# Patient Record
Sex: MALE | Race: BLACK OR AFRICAN AMERICAN | NOT HISPANIC OR LATINO | Employment: UNEMPLOYED | URBAN - METROPOLITAN AREA
[De-identification: names, ages, dates, MRNs, and addresses within clinical notes are randomized per-mention and may not be internally consistent; named-entity substitution may affect disease eponyms.]

---

## 2017-03-20 ENCOUNTER — HOSPITAL ENCOUNTER (EMERGENCY)
Facility: HOSPITAL | Age: 57
Discharge: HOME | End: 2017-03-20
Attending: FAMILY MEDICINE
Payer: MEDICAID

## 2017-03-20 VITALS
TEMPERATURE: 99 F | SYSTOLIC BLOOD PRESSURE: 135 MMHG | DIASTOLIC BLOOD PRESSURE: 76 MMHG | RESPIRATION RATE: 18 BRPM | BODY MASS INDEX: 41.03 KG/M2 | HEART RATE: 73 BPM | OXYGEN SATURATION: 97 % | HEIGHT: 69 IN | WEIGHT: 277 LBS

## 2017-03-20 DIAGNOSIS — M25.562 ACUTE PAIN OF LEFT KNEE: Primary | ICD-10-CM

## 2017-03-20 PROCEDURE — 99283 EMERGENCY DEPT VISIT LOW MDM: CPT

## 2017-03-20 PROCEDURE — 99283 EMERGENCY DEPT VISIT LOW MDM: CPT | Mod: ,,, | Performed by: PHYSICIAN ASSISTANT

## 2017-03-20 RX ORDER — NAPROXEN 500 MG/1
500 TABLET ORAL 2 TIMES DAILY WITH MEALS
Qty: 14 TABLET | Refills: 0 | Status: SHIPPED | OUTPATIENT
Start: 2017-03-20

## 2017-03-20 NOTE — ED PROVIDER NOTES
Encounter Date: 3/20/2017    SCRIBE #1 NOTE: I, Debbieantonio Collado, am scribing for, and in the presence of, Chantel Villegas PA-C.       History     Chief Complaint   Patient presents with    Knee Pain     started hurting on friday, can't recall injury     Review of patient's allergies indicates:  No Known Allergies  HPI Comments: Time seen by provider: 2:51 PM    This is a 56 y.o. male with no PMHx who presents with complaint of left knee pain that acutely onset after bending over to  an item off of the floor 3 days ago. Patient explains pain did not become significant until last night. He reports he can barely bear weight to leg, and pain is exacerbated by movement. He states he took 2 Ibuprofen with some improvement in pain. He denies paresthesias to the left leg, fever, and chills.       The history is provided by the patient.     History reviewed. No pertinent past medical history.  History reviewed. No pertinent surgical history.  History reviewed. No pertinent family history.  Social History   Substance Use Topics    Smoking status: Current Every Day Smoker     Packs/day: 1.00     Types: Cigarettes    Smokeless tobacco: None    Alcohol use No     Review of Systems   Constitutional: Negative for chills and fever.   Musculoskeletal:        Positive for left knee pain   Neurological:        Negative for paresthesias        Physical Exam   Initial Vitals   BP Pulse Resp Temp SpO2   03/20/17 1222 03/20/17 1222 03/20/17 1222 03/20/17 1222 03/20/17 1222   135/76 73 18 98.6 °F (37 °C) 97 %     Physical Exam    Nursing note and vitals reviewed.  Constitutional:   Non-toxic appearing. Patient obese   Cardiovascular: Normal rate, regular rhythm and intact distal pulses.   Pulmonary/Chest: No tachypnea.   Normal effort   Musculoskeletal:   Tenderness to the anterior interior aspect of the left knee. No significant swelling. No gross deformity. No erythema or warmth. Full range of motion of the knee with pain. With  flexion past 90 degrees. No laxity with varus or valgus stress.    Neurological: He is alert and oriented to person, place, and time.         ED Course   Procedures  Labs Reviewed - No data to display          Medical Decision Making:   History:   Old Medical Records: I decided to obtain old medical records.       APC / Resident Notes:   MDM:    My initial differential diagnosis includes sprain, strain, arthritis, gout, fracture, and septic joint.     56 year old male presents for evaluation of left knee pain after an awkward movement leaning over. Pain is improved with Ibuprofen. He is afebrile. Vitals are within normal limits. Tenderness to the anterior interior aspect of the left knee. No significant swelling. No gross deformity. No erythema or warmth. Full range of motion of the knee with pain. Flexion past 90 degrees. No laxity with varus or valgus stress. He is neurovascularly intact. Patient seen ambulated in the ED.     I do not feel that further workup or imaging is indicated. Patient's symptoms are likely secondary to a sprain versus arthritis. I doubt gout or septic joint. I will discharge with NSAID's and instructions for RICE therapy. PCP follow up in 3-4 days for reevaluation. ED warnings and return instructions given. I have reviewed this patient's medical chart and discussed with my supervising physician.       Scribe Attestation:   Scribe #1: I performed the above scribed service and the documentation accurately describes the services I performed. I attest to the accuracy of the note.    Attending Attestation:           Physician Attestation for Scribe:  Physician Attestation Statement for Scribe #1: I, Chantel Villegas PA-C, reviewed documentation, as scribed by Debbie Collado in my presence, and it is both accurate and complete.                 ED Course     Clinical Impression:   The encounter diagnosis was Acute pain of left knee.    Disposition:   Disposition: Discharged  Condition: Stable        Chantel Villegas PA-C  03/20/17 3569

## 2017-03-20 NOTE — DISCHARGE INSTRUCTIONS
Knee Pain  Knee pain is very common. Its especially common in active people who put a lot of pressure on their knees, like runners. It affects women more often than men.  Your kneecap (patella) is a thick, round bone. It covers and protects the front portion of your knee joint. It moves along a groove in your thighbone (femur) as part of the patellofemoral joint. A layer of cartilage surrounds the underside of your kneecap. This layer protects it from grinding against your femur.  When this cartilage softens and breaks down, it can cause knee pain. This is partly because of repetitive stress. The stress irritates the lining of the joint. This causes pain in the underlying bone.  What causes knee pain?  Many things can cause knee pain. You may have more than one cause. Some of these include:  · Overuse of the knee joint  · The kneecap doesnt line up with the tissue around it  · Damage to small nerves in the area  · Damage to the ligament-like structure that holds the kneecap in place (retinaculum)  · Breakdown of the bone under the cartilage  · Swelling in the soft tissues around the kneecap  · Injury  You might be more likely to have knee pain if you:  · Exercise a lot  · Recently increased the intensity of your workouts  · Have a body mass index (BMI) greater than 25  · Have poor alignment of your kneecap  · Walk with your feet turned overly outward or inward  · Have weakness in surrounding muscle groups (inner quad or hip adductor muscles)  · Have too much tightness in surrounding muscle groups (hamstrings or iliotibial band)  · Have a recent history of injury to the area  · Are female  Symptoms of knee pain  This type of knee pain is a dull, aching pain in the front of the knee in the area under and around the kneecap. This pain may start quickly or slowly. Your pain might be worse when you squat, run, or sit for a long time. You might also sometimes feel like your knee is giving out. You may have symptoms in  one or both of your knees.  Diagnosing knee pain  Your health care provider will ask about your medical history and your symptoms. Be sure to describe any activities that make your knee pain worse. He or she will look at your knee. This will include tests of your range of motion, strength, and areas of pain of your knee. Your knee alignment will be checked.  Your health care provider will need to rule out other causes of your knee pain, such as arthritis. You may need an imaging test, such as an X-ray or MRI.  Treatment for knee pain  Treatments that can help ease your symptoms may include:  · Avoiding activities for a while that make your pain worse, returning to activity over time  · Icing the outside of your knee when it causes you pain  · Taking over-the-counter pain medicine  · Wearing a knee brace or taping your knee to support it  · Wearing special shoe inserts to help keep your feet in the proper alignment  · Doing special exercises to stretch and strengthen the muscles around your hip and your knee  These steps help most people manage knee pain. But some cases of knee pain need to be treated with surgery. You may need surgery right away. Or you may need it later if other treatments dont work. Your health care provider may refer you to an orthopedic surgeon. He or she will talk with you about your choices.  Preventing knee pain  Losing weight and correcting excess muscle tightness or muscle weakness may help lower your risk.  In some cases, you can prevent knee pain. To help prevent a flare-up of knee pain, you do these things:  · Regularly do all the exercises your doctor or physical therapist advises  · Support your knee as advised by your doctor or physical therapist  · Increase training gradually, and ease up on training when needed  · Have an expert check your gait for running or other sporting activities  · Stretch properly before and after exercise  · Replace your running shoes regularly  · Lose  excess weight     When to call your health care provider  Call your health care provider right away if:  · Your symptoms dont get better after a few weeks of treatment  · You have any new symptoms   Date Last Reviewed: 3/19/2015  © 5047-3571 [employer]. [address] [place] [address], [place] [address]. All rights reserved. This information is not intended as a substitute for professional medical care. Always follow your healthcare professional's instructions.

## 2017-03-20 NOTE — ED AVS SNAPSHOT
[facility]  [address]  [facility] [address]               Stevan Camargo   3/20/2017  2:06 PM   ED    Description:  Male : 1960   Department:  [facility]           Your Care was Coordinated By:     Provider Role From To    Constantino Bennett MD Attending Provider 17 [phone] --    Chantel Villegas PA-C Physician Assistant 17 [phone] --      Reason for Visit     Knee Pain           Diagnoses this Visit        Comments    Acute pain of left knee    -  Primary       ED Disposition     ED Disposition Condition Comment    Discharge             To Do List           Follow-up Information     Follow up with [facility].    Contact information:    [address] [facility] [address]  [phone]         These Medications        Disp Refills Start End    naproxen (NAPROSYN) 500 MG tablet 14 tablet 0 3/20/2017     Take 1 tablet (500 mg total) by mouth 2 (two) times daily with meals. Take with food - Oral      [facility] On Call     [facility] Care [facility] -  Assistance  Registered nurses in the [facility] provide clinical advisement, health education, appointment booking, and other advisory services.  Call for this free service at [phone].             Medications           Message regarding Medications     Verify the changes and/or additions to your medication regime listed below are the same as discussed with your clinician today.  If any of these changes or additions are incorrect, please notify your healthcare provider.        START taking these NEW medications        Refills    naproxen (NAPROSYN) 500 MG tablet 0    Sig: Take 1 tablet (500 mg total) by mouth 2 (two) times daily with meals. Take with food    Class: Print    Route: Oral           Verify that the below list of medications is an accurate representation of the medications you are currently taking.  If none reported, the list  "may be blank. If incorrect, please contact your healthcare provider. Carry this list with you in case of emergency.           Current Medications     naproxen (NAPROSYN) 500 MG tablet Take 1 tablet (500 mg total) by mouth 2 (two) times daily with meals. Take with food           Clinical Reference Information           Your Vitals Were     BP Pulse Temp Resp Height Weight    135/76 73 98.6 °F (37 °C) (Oral) 18 5' 8.5" (1.74 m) 125.6 kg (277 lb)    SpO2 BMI             97% 41.51 kg/m2         Allergies as of 3/20/2017     No Known Allergies      Immunizations Administered on Date of Encounter - 3/20/2017     None      ED Micro, Lab, POCT     None      ED Imaging Orders     None        Discharge Instructions         Knee Pain  Knee pain is very common. Its especially common in active people who put a lot of pressure on their knees, like runners. It affects women more often than men.  Your kneecap (patella) is a thick, round bone. It covers and protects the front portion of your knee joint. It moves along a groove in your thighbone (femur) as part of the patellofemoral joint. A layer of cartilage surrounds the underside of your kneecap. This layer protects it from grinding against your femur.  When this cartilage softens and breaks down, it can cause knee pain. This is partly because of repetitive stress. The stress irritates the lining of the joint. This causes pain in the underlying bone.  What causes knee pain?  Many things can cause knee pain. You may have more than one cause. Some of these include:  · Overuse of the knee joint  · The kneecap doesnt line up with the tissue around it  · Damage to small nerves in the area  · Damage to the ligament-like structure that holds the kneecap in place (retinaculum)  · Breakdown of the bone under the cartilage  · Swelling in the soft tissues around the kneecap  · Injury  You might be more likely to have knee pain if you:  · Exercise a lot  · Recently increased the intensity " of your workouts  · Have a body mass index (BMI) greater than 25  · Have poor alignment of your kneecap  · Walk with your feet turned overly outward or inward  · Have weakness in surrounding muscle groups (inner quad or hip adductor muscles)  · Have too much tightness in surrounding muscle groups (hamstrings or iliotibial band)  · Have a recent history of injury to the area  · Are female  Symptoms of knee pain  This type of knee pain is a dull, aching pain in the front of the knee in the area under and around the kneecap. This pain may start quickly or slowly. Your pain might be worse when you squat, run, or sit for a long time. You might also sometimes feel like your knee is giving out. You may have symptoms in one or both of your knees.  Diagnosing knee pain  Your health care provider will ask about your medical history and your symptoms. Be sure to describe any activities that make your knee pain worse. He or she will look at your knee. This will include tests of your range of motion, strength, and areas of pain of your knee. Your knee alignment will be checked.  Your health care provider will need to rule out other causes of your knee pain, such as arthritis. You may need an imaging test, such as an X-ray or MRI.  Treatment for knee pain  Treatments that can help ease your symptoms may include:  · Avoiding activities for a while that make your pain worse, returning to activity over time  · Icing the outside of your knee when it causes you pain  · Taking over-the-counter pain medicine  · Wearing a knee brace or taping your knee to support it  · Wearing special shoe inserts to help keep your feet in the proper alignment  · Doing special exercises to stretch and strengthen the muscles around your hip and your knee  These steps help most people manage knee pain. But some cases of knee pain need to be treated with surgery. You may need surgery right away. Or you may need it later if other treatments dont work. Your  health care provider may refer you to an orthopedic surgeon. He or she will talk with you about your choices.  Preventing knee pain  Losing weight and correcting excess muscle tightness or muscle weakness may help lower your risk.  In some cases, you can prevent knee pain. To help prevent a flare-up of knee pain, you do these things:  · Regularly do all the exercises your doctor or physical therapist advises  · Support your knee as advised by your doctor or physical therapist  · Increase training gradually, and ease up on training when needed  · Have an expert check your gait for running or other sporting activities  · Stretch properly before and after exercise  · Replace your running shoes regularly  · Lose excess weight     When to call your health care provider  Call your health care provider right away if:  · Your symptoms dont get better after a few weeks of treatment  · You have any new symptoms   Date Last Reviewed: 3/19/2015  © 5533-4151 [employer]. [address] [place] [address]. All rights reserved. This information is not intended as a substitute for professional medical care. Always follow your healthcare professional's instructions.          Discharge References/Attachments     R.I.C.E. (ENGLISH)      [facility] Sign-Up     Activating your [facility] account is as easy as 1-2-3!     1) Visit [email or web address], select Sign Up Now, enter this activation code and your date of birth, then select Next.  [email or web address]  Expires: 5/4/2017  3:10 PM      2) Create a username and password to use when you visit [facility] in the future and select a security question in case you lose your password and select Next.    3) Enter your e-mail address and click Sign Up!    Additional Information  If you have questions, please e-mail [email or web address] or call [phone] to talk to our [facility] staff. Remember, [facility] is NOT to be used for urgent needs. For medical emergencies, dial 911.          Smoking Cessation     If you would like to quit smoking:   You may be eligible for free services if you are a [place] resident and started smoking cigarettes before September 1, 1988.  Call the Smoking Cessation Trust (SCT) toll free at (661) 993-4474 or (865) 571-8798.   Call 1-800-QUIT-NOW if you do not meet the above criteria.             [facility] complies with applicable Federal civil rights laws and does not discriminate on the basis of race, color, national origin, age, disability, or sex.        Language Assistance Services     ATTENTION: Language assistance services are available, free of charge. Please call [phone].      ATENCIÓN: Si habla español, tiene a hanna disposición servicios gratuitos de asistencia lingüística. Llame al [phone].     CHÚ Ý: N?u b?n nói Ti?ng Vi?t, có các d?ch v? h? tr? ngôn ng? mi?n phí dành cho b?n. G?i s? [phone].

## 2023-09-18 DIAGNOSIS — Z76.89 ENCOUNTER TO ESTABLISH CARE: Primary | ICD-10-CM

## 2023-09-18 DIAGNOSIS — Z00.00 ROUTINE ADULT HEALTH MAINTENANCE: ICD-10-CM

## 2023-09-19 ENCOUNTER — HOSPITAL ENCOUNTER (OUTPATIENT)
Dept: CARDIOLOGY | Facility: HOSPITAL | Age: 63
Discharge: HOME | End: 2023-09-19
Attending: INTERNAL MEDICINE
Payer: MEDICARE

## 2023-09-19 ENCOUNTER — OFFICE VISIT (OUTPATIENT)
Dept: CARDIOLOGY | Facility: CLINIC | Age: 63
End: 2023-09-19
Payer: MEDICARE

## 2023-09-19 VITALS
SYSTOLIC BLOOD PRESSURE: 112 MMHG | OXYGEN SATURATION: 95 % | DIASTOLIC BLOOD PRESSURE: 68 MMHG | HEIGHT: 69 IN | BODY MASS INDEX: 41.86 KG/M2 | WEIGHT: 282.63 LBS | HEART RATE: 69 BPM

## 2023-09-19 DIAGNOSIS — I82.5Y3 CHRONIC DEEP VEIN THROMBOSIS (DVT) OF PROXIMAL VEIN OF BOTH LOWER EXTREMITIES: ICD-10-CM

## 2023-09-19 DIAGNOSIS — I25.118 CORONARY ARTERY DISEASE INVOLVING NATIVE CORONARY ARTERY OF NATIVE HEART WITH OTHER FORM OF ANGINA PECTORIS: ICD-10-CM

## 2023-09-19 DIAGNOSIS — F17.290 SMOKES CIGARS: ICD-10-CM

## 2023-09-19 DIAGNOSIS — I50.810 RIGHT VENTRICULAR FAILURE: ICD-10-CM

## 2023-09-19 DIAGNOSIS — I50.30 HEART FAILURE WITH PRESERVED EJECTION FRACTION, UNSPECIFIED HF CHRONICITY: ICD-10-CM

## 2023-09-19 DIAGNOSIS — Z00.00 ROUTINE ADULT HEALTH MAINTENANCE: ICD-10-CM

## 2023-09-19 DIAGNOSIS — E78.5 HYPERLIPIDEMIA, UNSPECIFIED HYPERLIPIDEMIA TYPE: Primary | ICD-10-CM

## 2023-09-19 DIAGNOSIS — E66.01 MORBID OBESITY: ICD-10-CM

## 2023-09-19 DIAGNOSIS — Z76.89 ENCOUNTER TO ESTABLISH CARE: ICD-10-CM

## 2023-09-19 DIAGNOSIS — J44.9 CHRONIC OBSTRUCTIVE PULMONARY DISEASE, UNSPECIFIED COPD TYPE: ICD-10-CM

## 2023-09-19 PROCEDURE — 93010 ELECTROCARDIOGRAM REPORT: CPT | Mod: ,,, | Performed by: INTERNAL MEDICINE

## 2023-09-19 PROCEDURE — 99204 PR OFFICE/OUTPT VISIT, NEW, LEVL IV, 45-59 MIN: ICD-10-PCS | Mod: S$GLB,,, | Performed by: INTERNAL MEDICINE

## 2023-09-19 PROCEDURE — 4010F PR ACE/ARB THEARPY RXD/TAKEN: ICD-10-PCS | Mod: CPTII,S$GLB,, | Performed by: INTERNAL MEDICINE

## 2023-09-19 PROCEDURE — 1159F PR MEDICATION LIST DOCUMENTED IN MEDICAL RECORD: ICD-10-PCS | Mod: CPTII,S$GLB,, | Performed by: INTERNAL MEDICINE

## 2023-09-19 PROCEDURE — 99204 OFFICE O/P NEW MOD 45 MIN: CPT | Mod: S$GLB,,, | Performed by: INTERNAL MEDICINE

## 2023-09-19 PROCEDURE — 3078F DIAST BP <80 MM HG: CPT | Mod: CPTII,S$GLB,, | Performed by: INTERNAL MEDICINE

## 2023-09-19 PROCEDURE — 3074F SYST BP LT 130 MM HG: CPT | Mod: CPTII,S$GLB,, | Performed by: INTERNAL MEDICINE

## 2023-09-19 PROCEDURE — 3008F BODY MASS INDEX DOCD: CPT | Mod: CPTII,S$GLB,, | Performed by: INTERNAL MEDICINE

## 2023-09-19 PROCEDURE — 3008F PR BODY MASS INDEX (BMI) DOCUMENTED: ICD-10-PCS | Mod: CPTII,S$GLB,, | Performed by: INTERNAL MEDICINE

## 2023-09-19 PROCEDURE — 4010F ACE/ARB THERAPY RXD/TAKEN: CPT | Mod: CPTII,S$GLB,, | Performed by: INTERNAL MEDICINE

## 2023-09-19 PROCEDURE — 99999 PR PBB SHADOW E&M-EST. PATIENT-LVL IV: CPT | Mod: PBBFAC,,, | Performed by: INTERNAL MEDICINE

## 2023-09-19 PROCEDURE — 3078F PR MOST RECENT DIASTOLIC BLOOD PRESSURE < 80 MM HG: ICD-10-PCS | Mod: CPTII,S$GLB,, | Performed by: INTERNAL MEDICINE

## 2023-09-19 PROCEDURE — 93010 EKG 12-LEAD: ICD-10-PCS | Mod: ,,, | Performed by: INTERNAL MEDICINE

## 2023-09-19 PROCEDURE — 99999 PR PBB SHADOW E&M-EST. PATIENT-LVL IV: ICD-10-PCS | Mod: PBBFAC,,, | Performed by: INTERNAL MEDICINE

## 2023-09-19 PROCEDURE — 3074F PR MOST RECENT SYSTOLIC BLOOD PRESSURE < 130 MM HG: ICD-10-PCS | Mod: CPTII,S$GLB,, | Performed by: INTERNAL MEDICINE

## 2023-09-19 PROCEDURE — 1159F MED LIST DOCD IN RCRD: CPT | Mod: CPTII,S$GLB,, | Performed by: INTERNAL MEDICINE

## 2023-09-19 PROCEDURE — 93005 ELECTROCARDIOGRAM TRACING: CPT

## 2023-09-19 RX ORDER — ACETAZOLAMIDE 125 MG/1
125 TABLET ORAL
COMMUNITY
Start: 2023-08-27

## 2023-09-19 RX ORDER — RIVAROXABAN 20 MG/1
20 TABLET, FILM COATED ORAL
COMMUNITY
Start: 2023-08-29

## 2023-09-19 RX ORDER — ISOSORBIDE DINITRATE 10 MG/1
10 TABLET ORAL
COMMUNITY
Start: 2023-07-20

## 2023-09-19 RX ORDER — UMECLIDINIUM 62.5 UG/1
1 AEROSOL, POWDER ORAL
COMMUNITY
Start: 2023-07-20

## 2023-09-19 RX ORDER — ALLOPURINOL 300 MG/1
300 TABLET ORAL
COMMUNITY
Start: 2023-07-10

## 2023-09-19 RX ORDER — GABAPENTIN 300 MG/1
300 CAPSULE ORAL
COMMUNITY
Start: 2023-09-06

## 2023-09-19 RX ORDER — FLUTICASONE PROPIONATE AND SALMETEROL XINAFOATE 45; 21 UG/1; UG/1
1 AEROSOL, METERED RESPIRATORY (INHALATION) 2 TIMES DAILY
COMMUNITY

## 2023-09-19 RX ORDER — ASPIRIN 81 MG/1
81 TABLET ORAL DAILY
COMMUNITY

## 2023-09-19 RX ORDER — ALBUTEROL SULFATE 90 UG/1
AEROSOL, METERED RESPIRATORY (INHALATION)
COMMUNITY
Start: 2023-05-23

## 2023-09-19 RX ORDER — IPRATROPIUM BROMIDE AND ALBUTEROL 20; 100 UG/1; UG/1
SPRAY, METERED RESPIRATORY (INHALATION)
COMMUNITY
Start: 2023-04-11

## 2023-09-19 RX ORDER — NITROGLYCERIN 0.4 MG/1
TABLET SUBLINGUAL
COMMUNITY
Start: 2023-05-23

## 2023-09-19 RX ORDER — OXYCODONE AND ACETAMINOPHEN 5; 325 MG/1; MG/1
1 TABLET ORAL EVERY 6 HOURS PRN
COMMUNITY
Start: 2023-07-11

## 2023-09-19 RX ORDER — METOLAZONE 2.5 MG/1
2.5 TABLET ORAL
COMMUNITY
Start: 2023-07-10

## 2023-09-19 RX ORDER — SACUBITRIL AND VALSARTAN 24; 26 MG/1; MG/1
1 TABLET, FILM COATED ORAL
COMMUNITY
Start: 2023-07-19

## 2023-09-19 RX ORDER — METOPROLOL SUCCINATE 50 MG/1
50 TABLET, EXTENDED RELEASE ORAL
COMMUNITY
Start: 2023-08-26

## 2023-09-19 RX ORDER — FUROSEMIDE 40 MG/1
40 TABLET ORAL
COMMUNITY

## 2023-09-19 NOTE — PROGRESS NOTES
Subjective:   Patient ID:  Stevan Camargo is a 62 y.o. male who presents for evaluation of Shortness of Breath      HPI  62-year-old male with past medical history relevant for COPD, PE/DVT in 2019 (on xarelto), HFpEF, ARSENIO on CPAP, left leg and cellulitis in 2022, right ventricular failure, COPD on home O2 .  He comes in today for establishing care with a new provider.  He says that he had a heart catheterization 2 months ago that showed triple-vessel disease and was referred to Dr. Palacios.  His cardiologist is Dr. Shrestha.  He states that the CT surgeon denied him for the bypass he said that he would be high risk for complication.  I do not have access to these records nor his angiogram films.  Thus we will request these records today.    For now patient main complaint as dyspnea on exertion NYHA 1-2.  He is compliant with his current medications.    He also takes allopurinol for history of gout.  He states that although he was told not to take ibuprofen he takes ibuprofen on and off still knowing the risks.  He is down from 10 cigars a day to 4 cigars a day.  History reviewed. No pertinent past medical history.    History reviewed. No pertinent surgical history.    Social History     Tobacco Use    Smoking status: Every Day     Current packs/day: 1.00     Types: Cigarettes   Substance Use Topics    Alcohol use: No    Drug use: No       History reviewed. No pertinent family history.    Review of Systems   Cardiovascular:  Positive for dyspnea on exertion. Negative for chest pain, palpitations and syncope.   Genitourinary: Negative.    Neurological: Negative.        Current Outpatient Medications on File Prior to Visit   Medication Sig    acetaZOLAMIDE (DIAMOX) 125 MG Tab Take 125 mg by mouth.    albuterol (PROVENTIL/VENTOLIN HFA) 90 mcg/actuation inhaler SMARTSI Puff(s) Via Inhaler Every 4-6 Hours PRN    allopurinoL (ZYLOPRIM) 300 MG tablet Take 300 mg by mouth.    aspirin (ECOTRIN) 81 MG EC tablet Take 81  mg by mouth once daily.    COMBIVENT RESPIMAT  mcg/actuation inhaler INHALE 1 PUFF BY MOUTH EVERY 6 HOURS AS NEEDED    empagliflozin (JARDIANCE) 10 mg tablet Take by mouth.    fluticasone propion-salmeterol 45-21 mcg/dose (ADVAIR HFA) 45-21 mcg/actuation HFAA inhaler Inhale 1 puff into the lungs 2 (two) times daily.    furosemide (LASIX) 40 MG tablet Take 40 mg by mouth.    gabapentin (NEURONTIN) 300 MG capsule Take 300 mg by mouth.    isosorbide dinitrate (ISORDIL) 10 MG tablet Take 10 mg by mouth.    metOLazone (ZAROXOLYN) 2.5 MG tablet Take 2.5 mg by mouth.    metoprolol succinate (TOPROL-XL) 50 MG 24 hr tablet Take 50 mg by mouth.    nitroGLYCERIN (NITROSTAT) 0.4 MG SL tablet if needed    oxyCODONE-acetaminophen (PERCOCET) 5-325 mg per tablet Take 1 tablet by mouth every 6 (six) hours as needed.    sacubitriL-valsartan (ENTRESTO) 24-26 mg per tablet Take 1 tablet by mouth.    umeclidinium (INCRUSE ELLIPTA) 62.5 mcg/actuation inhalation capsule Inhale 1 puff into the lungs.    XARELTO 20 mg Tab Take 20 mg by mouth.    naproxen (NAPROSYN) 500 MG tablet Take 1 tablet (500 mg total) by mouth 2 (two) times daily with meals. Take with food (Patient not taking: Reported on 9/19/2023)     No current facility-administered medications on file prior to visit.       Objective:   Objective:  Wt Readings from Last 3 Encounters:   09/19/23 128.2 kg (282 lb 10.1 oz)   03/20/17 125.6 kg (277 lb)     Temp Readings from Last 3 Encounters:   03/20/17 98.6 °F (37 °C) (Oral)     BP Readings from Last 3 Encounters:   09/19/23 112/68   03/20/17 135/76     Pulse Readings from Last 3 Encounters:   09/19/23 69   03/20/17 73       Physical Exam  Vitals reviewed.   Constitutional:       Appearance: He is well-developed.   Neck:      Vascular: No carotid bruit.   Cardiovascular:      Rate and Rhythm: Normal rate and regular rhythm.      Pulses: Intact distal pulses.      Heart sounds: Normal heart sounds. No murmur heard.  Pulmonary:  "     Breath sounds: Normal breath sounds.   Neurological:      Mental Status: He is oriented to person, place, and time.         Lab Results   Component Value Date    CHOL 135 10/22/2022     Lab Results   Component Value Date    HDL 30 (L) 10/22/2022     Lab Results   Component Value Date    LDLCALC 94 10/22/2022     Lab Results   Component Value Date    TRIG 55 10/22/2022     No results found for: "CHOLHDL"    Chemistry        Component Value Date/Time     10/25/2022 0542    K 4.5 10/25/2022 0542     10/25/2022 0542    CO2 37 (H) 10/25/2022 0542    BUN 31 (H) 10/25/2022 0542    CREATININE 1.85 (H) 10/25/2022 0542        Component Value Date/Time    CALCIUM 8.6 (L) 10/25/2022 0542    ESTGFRAFRICA 41 10/25/2022 0542          No results found for: "TSH"  No results found for: "INR", "PROTIME"  No results found for: "WBC", "HGB", "HCT", "MCV", "PLT"  BNP  @LABRCNTIP(BNP,BNPTRIAGEBLO)@  CrCl cannot be calculated (Patient's most recent lab result is older than the maximum 7 days allowed.).     Imaging:  ======    No results found for this or any previous visit.    No results found for this or any previous visit.    No results found for this or any previous visit.    No results found for this or any previous visit.    No valid procedures specified.    No results found for this or any previous visit.      No results found for this or any previous visit.      No results found for this or any previous visit.      Diagnostic Results:  ECG: Reviewed    The 10-year ASCVD risk score (Julio DK, et al., 2019) is: 12.3%    Values used to calculate the score:      Age: 62 years      Sex: Male      Is Non- : Yes      Diabetic: No      Tobacco smoker: Yes      Systolic Blood Pressure: 112 mmHg      Is BP treated: No      HDL Cholesterol: 30 mg/dL      Total Cholesterol: 135 mg/dL        Assessment and Plan:   Hyperlipidemia, unspecified hyperlipidemia type    Coronary artery disease involving " native coronary artery of native heart with other form of angina pectoris    Morbid obesity    Chronic obstructive pulmonary disease, unspecified COPD type    Right ventricular failure    Heart failure with preserved ejection fraction, unspecified HF chronicity    Chronic deep vein thrombosis (DVT) of proximal vein of both lower extremities    Smokes cigars      Continue with current medical treatment.  On Toprol, Entresto.  Isordil  On Xarelto for history of DVT.    On aspirin 81 mg daily.    We will get his records from  Reviewed all tests and above medical conditions with patient in detail and formulated treatment plan.  Risk factor modification discussed.   Cardiac low salt diet discussed.  Maintaining healthy weight and weight loss goals were discussed in clinic.  Unclear why not on statin.  LDL 94.  States Will check. ?  will await for records  We will request his records  Counseled on smoking.    Discussed also risk of PARAG.  He wants me to overview his records and get back to him.  Follow up in 3 months

## 2023-12-18 ENCOUNTER — OFFICE VISIT (OUTPATIENT)
Dept: CARDIOLOGY | Facility: CLINIC | Age: 63
End: 2023-12-18
Payer: MEDICARE

## 2023-12-18 VITALS
OXYGEN SATURATION: 92 % | HEART RATE: 91 BPM | HEIGHT: 69 IN | DIASTOLIC BLOOD PRESSURE: 58 MMHG | SYSTOLIC BLOOD PRESSURE: 96 MMHG | WEIGHT: 270.94 LBS | BODY MASS INDEX: 40.13 KG/M2

## 2023-12-18 DIAGNOSIS — E66.01 MORBID OBESITY: ICD-10-CM

## 2023-12-18 DIAGNOSIS — Z87.891 EX-SMOKER: ICD-10-CM

## 2023-12-18 DIAGNOSIS — J44.9 CHRONIC OBSTRUCTIVE PULMONARY DISEASE, UNSPECIFIED COPD TYPE: ICD-10-CM

## 2023-12-18 DIAGNOSIS — Z98.890 HISTORY OF CORONARY ANGIOGRAM: ICD-10-CM

## 2023-12-18 DIAGNOSIS — I50.810 RIGHT VENTRICULAR FAILURE: ICD-10-CM

## 2023-12-18 DIAGNOSIS — E78.5 HYPERLIPIDEMIA, UNSPECIFIED HYPERLIPIDEMIA TYPE: ICD-10-CM

## 2023-12-18 DIAGNOSIS — I82.5Y3 CHRONIC DEEP VEIN THROMBOSIS (DVT) OF PROXIMAL VEIN OF BOTH LOWER EXTREMITIES: ICD-10-CM

## 2023-12-18 DIAGNOSIS — R06.09 OTHER FORM OF DYSPNEA: Primary | ICD-10-CM

## 2023-12-18 PROCEDURE — 3074F SYST BP LT 130 MM HG: CPT | Mod: CPTII,S$GLB,, | Performed by: INTERNAL MEDICINE

## 2023-12-18 PROCEDURE — 3078F DIAST BP <80 MM HG: CPT | Mod: CPTII,S$GLB,, | Performed by: INTERNAL MEDICINE

## 2023-12-18 PROCEDURE — 4010F PR ACE/ARB THEARPY RXD/TAKEN: ICD-10-PCS | Mod: CPTII,S$GLB,, | Performed by: INTERNAL MEDICINE

## 2023-12-18 PROCEDURE — 99214 PR OFFICE/OUTPT VISIT, EST, LEVL IV, 30-39 MIN: ICD-10-PCS | Mod: S$GLB,,, | Performed by: INTERNAL MEDICINE

## 2023-12-18 PROCEDURE — 3008F BODY MASS INDEX DOCD: CPT | Mod: CPTII,S$GLB,, | Performed by: INTERNAL MEDICINE

## 2023-12-18 PROCEDURE — 1159F PR MEDICATION LIST DOCUMENTED IN MEDICAL RECORD: ICD-10-PCS | Mod: CPTII,S$GLB,, | Performed by: INTERNAL MEDICINE

## 2023-12-18 PROCEDURE — 99214 OFFICE O/P EST MOD 30 MIN: CPT | Mod: S$GLB,,, | Performed by: INTERNAL MEDICINE

## 2023-12-18 PROCEDURE — 1159F MED LIST DOCD IN RCRD: CPT | Mod: CPTII,S$GLB,, | Performed by: INTERNAL MEDICINE

## 2023-12-18 PROCEDURE — 99999 PR PBB SHADOW E&M-EST. PATIENT-LVL V: ICD-10-PCS | Mod: PBBFAC,,, | Performed by: INTERNAL MEDICINE

## 2023-12-18 PROCEDURE — 99999 PR PBB SHADOW E&M-EST. PATIENT-LVL V: CPT | Mod: PBBFAC,,, | Performed by: INTERNAL MEDICINE

## 2023-12-18 PROCEDURE — 3074F PR MOST RECENT SYSTOLIC BLOOD PRESSURE < 130 MM HG: ICD-10-PCS | Mod: CPTII,S$GLB,, | Performed by: INTERNAL MEDICINE

## 2023-12-18 PROCEDURE — 3078F PR MOST RECENT DIASTOLIC BLOOD PRESSURE < 80 MM HG: ICD-10-PCS | Mod: CPTII,S$GLB,, | Performed by: INTERNAL MEDICINE

## 2023-12-18 PROCEDURE — 4010F ACE/ARB THERAPY RXD/TAKEN: CPT | Mod: CPTII,S$GLB,, | Performed by: INTERNAL MEDICINE

## 2023-12-18 PROCEDURE — 3008F PR BODY MASS INDEX (BMI) DOCUMENTED: ICD-10-PCS | Mod: CPTII,S$GLB,, | Performed by: INTERNAL MEDICINE

## 2023-12-18 NOTE — PROGRESS NOTES
Subjective:   Patient ID:  Stevan Camargo is a 62 y.o. male who presents for evaluation of Dizziness    12.18.2023    Comes in for follow-up.    I have reviewed his coronary angiogram report from Dr. Cavazos.  Appears that he had a 50-60% diffuse LAD disease with only a significant lesion toward the apex was 75%.  His circumflex appears all to have 60% disease only.    He had a abnormal takeoff of his RCA however it was not engaged selectively and he was supposed to go for a coronary CTA.  However this has been held due to his CKD.    He denies any chest pain.    He has also dilated RV.  And significant pulmonary hypertension.  He states that he was denied for CABG by CT surgery however I do not have these reports.   He currently follows with Nephrology.    His main complaint is sometimes he gets dizzy when he stands up.  The have recommended to take metolazone 5 days a week instead of 7 days a week.  As well as decrease Entresto to once a day and if still no help to take half a pill at night.    9.2023  62-year-old male with past medical history relevant for COPD, PE/DVT in 2019 (on xarelto), HFpEF, ARSENIO on CPAP, left leg and cellulitis in July 2022, right ventricular failure, COPD on home O2 .  He comes in today for establishing care with a new provider.  He says that he had a heart catheterization 2 months ago that showed triple-vessel disease and was referred to Dr. Palacios.  His cardiologist is Dr. Shrestha.  He states that the CT surgeon denied him for the bypass he said that he would be high risk for complication.  I do not have access to these records nor his angiogram films.  Thus we will request these records today.    For now patient main complaint as dyspnea on exertion NYHA 1-2.  He is compliant with his current medications.    He also takes allopurinol for history of gout.  He states that although he was told not to take ibuprofen he takes ibuprofen on and off still knowing the risks.  He is down from 10  cigars a day to 4 cigars a day.  History reviewed. No pertinent past medical history.    History reviewed. No pertinent surgical history.    Social History     Tobacco Use    Smoking status: Every Day     Current packs/day: 1.00     Types: Cigarettes   Substance Use Topics    Alcohol use: No    Drug use: No       History reviewed. No pertinent family history.    Review of Systems   Cardiovascular:  Positive for dyspnea on exertion. Negative for chest pain, palpitations and syncope.   Genitourinary: Negative.    Neurological:  Positive for dizziness.       Current Outpatient Medications on File Prior to Visit   Medication Sig    acetaZOLAMIDE (DIAMOX) 125 MG Tab Take 125 mg by mouth.    albuterol (PROVENTIL/VENTOLIN HFA) 90 mcg/actuation inhaler SMARTSI Puff(s) Via Inhaler Every 4-6 Hours PRN    allopurinoL (ZYLOPRIM) 300 MG tablet Take 300 mg by mouth.    aspirin (ECOTRIN) 81 MG EC tablet Take 81 mg by mouth once daily.    COMBIVENT RESPIMAT  mcg/actuation inhaler INHALE 1 PUFF BY MOUTH EVERY 6 HOURS AS NEEDED    empagliflozin (JARDIANCE) 10 mg tablet Take by mouth.    fluticasone propion-salmeterol 45-21 mcg/dose (ADVAIR HFA) 45-21 mcg/actuation HFAA inhaler Inhale 1 puff into the lungs 2 (two) times daily.    furosemide (LASIX) 40 MG tablet Take 40 mg by mouth.    gabapentin (NEURONTIN) 300 MG capsule Take 300 mg by mouth.    isosorbide dinitrate (ISORDIL) 10 MG tablet Take 10 mg by mouth.    metOLazone (ZAROXOLYN) 2.5 MG tablet Take 2.5 mg by mouth.    metoprolol succinate (TOPROL-XL) 50 MG 24 hr tablet Take 50 mg by mouth.    naproxen (NAPROSYN) 500 MG tablet Take 1 tablet (500 mg total) by mouth 2 (two) times daily with meals. Take with food    nitroGLYCERIN (NITROSTAT) 0.4 MG SL tablet if needed    oxyCODONE-acetaminophen (PERCOCET) 5-325 mg per tablet Take 1 tablet by mouth every 6 (six) hours as needed.    sacubitriL-valsartan (ENTRESTO) 24-26 mg per tablet Take 1 tablet by mouth.    umeclidinium  "(INCRUSE ELLIPTA) 62.5 mcg/actuation inhalation capsule Inhale 1 puff into the lungs.    XARELTO 20 mg Tab Take 20 mg by mouth.     No current facility-administered medications on file prior to visit.       Objective:   Objective:  Wt Readings from Last 3 Encounters:   12/18/23 122.9 kg (270 lb 15.1 oz)   09/19/23 128.2 kg (282 lb 10.1 oz)   03/20/17 125.6 kg (277 lb)     Temp Readings from Last 3 Encounters:   03/20/17 98.6 °F (37 °C) (Oral)     BP Readings from Last 3 Encounters:   12/18/23 (!) 96/58   09/19/23 112/68   03/20/17 135/76     Pulse Readings from Last 3 Encounters:   12/18/23 91   09/19/23 69   03/20/17 73       Physical Exam  Vitals reviewed.   Constitutional:       Appearance: He is well-developed.   Neck:      Vascular: No carotid bruit.   Cardiovascular:      Rate and Rhythm: Normal rate and regular rhythm.      Pulses: Intact distal pulses.      Heart sounds: Normal heart sounds. No murmur heard.  Pulmonary:      Breath sounds: Normal breath sounds.   Neurological:      Mental Status: He is oriented to person, place, and time.         Lab Results   Component Value Date    CHOL 135 10/22/2022     Lab Results   Component Value Date    HDL 30 (L) 10/22/2022     Lab Results   Component Value Date    LDLCALC 94 10/22/2022     Lab Results   Component Value Date    TRIG 55 10/22/2022     No results found for: "CHOLHDL"    Chemistry        Component Value Date/Time     10/25/2022 0542    K 4.5 10/25/2022 0542     10/25/2022 0542    CO2 37 (H) 10/25/2022 0542    BUN 31 (H) 10/25/2022 0542    CREATININE 1.85 (H) 10/25/2022 0542        Component Value Date/Time    CALCIUM 8.6 (L) 10/25/2022 0542    ESTGFRAFRICA 41 10/25/2022 0542          No results found for: "TSH"  No results found for: "INR", "PROTIME"  No results found for: "WBC", "HGB", "HCT", "MCV", "PLT"  BNP  @LABRCNTIP(BNP,BNPTRIAGEBLO)@  CrCl cannot be calculated (Patient's most recent lab result is older than the maximum 7 days " allowed.).     Imaging:  ======    No results found for this or any previous visit.    No results found for this or any previous visit.    No results found for this or any previous visit.    No results found for this or any previous visit.    No valid procedures specified.    No results found for this or any previous visit.      No results found for this or any previous visit.      No results found for this or any previous visit.      Diagnostic Results:  ECG: Reviewed    The 10-year ASCVD risk score (Julio DK, et al., 2019) is: 9.5%    Values used to calculate the score:      Age: 62 years      Sex: Male      Is Non- : Yes      Diabetic: No      Tobacco smoker: Yes      Systolic Blood Pressure: 96 mmHg      Is BP treated: No      HDL Cholesterol: 30 mg/dL      Total Cholesterol: 135 mg/dL        Assessment and Plan:   Other form of dyspnea  -     Ambulatory referral/consult to Pulmonology; Future; Expected date: 12/25/2023    History of coronary angiogram    Morbid obesity    Hyperlipidemia, unspecified hyperlipidemia type    Chronic obstructive pulmonary disease, unspecified COPD type    Chronic deep vein thrombosis (DVT) of proximal vein of both lower extremities    Right ventricular failure    Ex-smoker        Continue with current medical treatment.  On Toprol, Entresto.  Isordil  On Xarelto for history of DVT.    On aspirin 81 mg daily.      Reviewed coronary angiogram report from Dr. Shrestha.   Appears to  have diffuse LAD disease but only significant toward the apex and a 60% lesion of his circumflex.  As per patient he was turned down for surgery.  But as per report it seems that   Revascularization is indicated at this moment.  However he does have dilated RV which would make him definitely a high-risk patient.  I will request the note from Dr. Palacios.  Reviewed all tests and above medical conditions with patient in detail and formulated treatment plan.  Risk factor modification  discussed.   Cardiac low salt diet discussed.  Maintaining healthy weight and weight loss goals were discussed in clinic.  Encouraged to maintain abstinence from smoking.    Unclear if he had intolerance to statins in the past.  Will start statin next visit he is agreeable.    Continue to follow with Nephrology.  Decrease metolazone to 5 days a week.  Decrease Entresto to half a pill at night.     Takes Isordil and Lasix once a day.  We will decrease Toprol if still hypotensive.  Follow up in 6 months